# Patient Record
Sex: FEMALE | Race: WHITE | NOT HISPANIC OR LATINO | Employment: FULL TIME | URBAN - METROPOLITAN AREA
[De-identification: names, ages, dates, MRNs, and addresses within clinical notes are randomized per-mention and may not be internally consistent; named-entity substitution may affect disease eponyms.]

---

## 2023-08-11 ENCOUNTER — HOSPITAL ENCOUNTER (OUTPATIENT)
Dept: DATA CONVERSION | Facility: HOSPITAL | Age: 58
Discharge: HOME | End: 2023-08-11

## 2023-08-11 DIAGNOSIS — Z12.31 ENCOUNTER FOR SCREENING MAMMOGRAM FOR MALIGNANT NEOPLASM OF BREAST: ICD-10-CM

## 2023-12-15 ENCOUNTER — APPOINTMENT (OUTPATIENT)
Dept: PHYSICAL THERAPY | Facility: CLINIC | Age: 58
End: 2023-12-15
Payer: COMMERCIAL

## 2023-12-15 ENCOUNTER — EVALUATION (OUTPATIENT)
Dept: PHYSICAL THERAPY | Facility: CLINIC | Age: 58
End: 2023-12-15
Payer: COMMERCIAL

## 2023-12-15 DIAGNOSIS — M54.12 RADICULOPATHY, CERVICAL REGION: ICD-10-CM

## 2023-12-15 PROCEDURE — 97014 ELECTRIC STIMULATION THERAPY: CPT | Mod: GP | Performed by: PHYSICAL THERAPIST

## 2023-12-15 PROCEDURE — 97161 PT EVAL LOW COMPLEX 20 MIN: CPT | Mod: GP | Performed by: PHYSICAL THERAPIST

## 2023-12-15 PROCEDURE — 97140 MANUAL THERAPY 1/> REGIONS: CPT | Mod: GP | Performed by: PHYSICAL THERAPIST

## 2023-12-15 SDOH — ECONOMIC STABILITY: GENERAL: QUALITY OF LIFE: EXCELLENT

## 2023-12-15 ASSESSMENT — PAIN SCALES - GENERAL: PAINLEVEL_OUTOF10: 5 - MODERATE PAIN

## 2023-12-15 ASSESSMENT — ENCOUNTER SYMPTOMS
EXACERBATED BY: KEYBOARDING
QUALITY: BURNING
QUALITY: RADIATING
EXACERBATED BY: OVERHEAD ACTIVITY
EXACERBATED BY: MOVEMENT
QUALITY: NEEDLE-LIKE
PAIN SCALE: 0
PAIN SCALE AT HIGHEST: 6
QUALITY: DULL ACHE
EXACERBATED BY: LIFTING
ALLEVIATING FACTORS: MEDICATIONS
ALLEVIATING FACTORS: CHANGE IN POSITION
PAIN SCALE AT LOWEST: 2
QUALITY: THROBBING
ALLEVIATING FACTORS: RELAXATION

## 2023-12-15 ASSESSMENT — PAIN - FUNCTIONAL ASSESSMENT: PAIN_FUNCTIONAL_ASSESSMENT: 0-10

## 2023-12-15 NOTE — PROGRESS NOTES
Physical Therapy Evaluation and Treatment     Patient Name: Verona Villarreal  MRN: [id number]  PT Received On: 12/15/23  PT Evaluation Time Entry  PT Evaluation (Low) Time Entry: 20  PT Modalities Time Entry  E-Stim (Unattended) Time Entry: 10  PT Therapeutic Procedures Time Entry  Manual Therapy Time Entry: 12    Current Problem:   1. Radiculopathy, cervical region  Referral to Physical Therapy    Follow Up In Physical Therapy        Precautions:       Subjective Evaluation    History of Present Illness  Mechanism of injury: Pt is a 58 y/o female c/o neck pain with radiculopathy into RUE into her fingers (for several years). Over the last couple months it has gotten severely worse and her older HEP's are not helping. Radic pain and N/T/B, takes gabapentin but no relief. The use of computer mouse hurts RUE, turning her head hurts RUE, denies head aches. Cannot sleep 2/2 pain. This is her third round of PT. She has a home traction unit, but states laying on the floor is hurting her back.     Quality of life: excellent    Pain  Current pain ratin  At best pain ratin  At worst pain ratin  Quality: dull ache, needle-like, radiating, throbbing and burning  Relieving factors: relaxation, change in position and medications  Aggravating factors: keyboarding, lifting, movement and overhead activity  Progression: worsening    Hand dominance: right    Diagnostic Tests  X-ray: abnormal  MRI studies: abnormal    Treatments  Previous treatment: physical therapy and medication  Current treatment: medication and physical therapy  Patient Goals  Patient goals for therapy: increased strength, independence with ADLs/IADLs, return to sport/leisure activities, increased motion and decreased pain         2018 [facility] MRI:  Impression: Degenerative changes and disc disease, especially involving the  C4-C5, C5-C6 and C6-C7 level, with spinal canal and right foraminal  stenosis, similar to prior. No myelopathy.    Objective      Postural Observations  Seated posture: fair (head deviates left)  Standing posture: fair (head deviates left)    Additional Postural Observation Details  VERY GUARDED, RIGID POSTURE, PT DOESNT TO WANT TO ROTATE HER HEAD    Active Range of Motion   Cervical/Thoracic Spine   Cervical    Flexion: 40 (DEVIATES L with flexion) degrees with pain  Extension: 15 (L lateral flexion compensation) degrees with pain  Left lateral flexion: 34 degrees   Right lateral flexion: 5 degrees with pain  Left rotation: 50 degrees   Right rotation: 28 degrees with pain    Strength/Myotome Testing     Right Shoulder     Planes of Motion   Flexion: 4   Extension: 4   Abduction: 4-   Adduction: 4   External rotation at 0°: 4-   Internal rotation at 0°: 4     Right Elbow   Flexion: 4  Extension: 4-   ULTT +        Treatments:  Reviewed HEP she already has at home from previous PT       Dry needling following informed consent: with e-stim; 3Hz, mA to tolerance, applied to R sided c-spine ps, suboccipitals and R upper trap, for 10 minutes.     Manual: STM to cervical ps, OA release, gentle traction (increased RUE radic), R SB increased radic, L sided down glides caused RUE radic    Assessment & Plan     Assessment  Impairments: abnormal muscle firing, activity intolerance, impaired physical strength, lacks appropriate home exercise program and pain with function  Other impairment: numbness, tingling, burning, weakness  Assessment details: Pt presents with significant postural deviations 2/2 neck pain and RUE radiculopathy. Pt very limited with positions and activities that she can complete 2/2 RUE radic symptoms. 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 Pt requires skilled therapy to address her functional deficits, weakness, N/T/B,   Barriers to therapy: none  Prognosis: good    Plan  Therapy options: will be seen for skilled physical therapy services  Planned  modality interventions: electrical stimulation/Russian stimulation, TENS, microcurrent electrical stimulation, high voltage pulsed current (pain management), traction and ultrasound  Planned therapy interventions: functional ROM exercises, home exercise program, IADL retraining, strengthening, spinal/joint mobilization, postural training, neuromuscular re-education, motor coordination training, manual therapy, ADL retraining, body mechanics training, joint mobilization and stretching  Frequency: 2x week  Duration in visits: 16  Duration in weeks: 12  Treatment plan discussed with: patient       Low complexity due to patient's clinical presentation being stable and uncomplicated by any significant comorbidities that may affect rehab tolerance and progression.     Post-Treatment Pain:  Response to Interventions: same    Goals:   Active       PT Problem       neck       Start:  12/15/23    Expected End:  03/15/24       1) Pt will report pain levels no greater than 2/10 at worst with activity for less difficulty turning head, looking up, and driving.  2) Pt will display pain-free cervical spine AROM WFL for less difficulty turning head, looking up, and driving.  3) Pt will score <10% impairment on NDI to indicate significant improvement in neck function.  4) Pt will deny any radiating pain or N&T into BUE.  5) Pt will improve postural awareness and understand proper ergonomics enabling patient to make timely self-corrections and avoid postural strain that develops into myofascial restrictions.

## 2023-12-18 ENCOUNTER — DOCUMENTATION (OUTPATIENT)
Dept: PHYSICAL THERAPY | Facility: CLINIC | Age: 58
End: 2023-12-18
Payer: COMMERCIAL

## 2023-12-18 NOTE — PROGRESS NOTES
Physical Therapy                 Therapy Communication Note    Patient Name: Verona Villarreal  MRN: [id number]  Today's Date: 12/18/2023     Discipline: Physical Therapy    Missed Visit Reason:      Missed Time: No Show    Comment:

## 2024-01-03 ENCOUNTER — TREATMENT (OUTPATIENT)
Dept: PHYSICAL THERAPY | Facility: CLINIC | Age: 59
End: 2024-01-03
Payer: COMMERCIAL

## 2024-01-03 DIAGNOSIS — M54.12 RADICULOPATHY, CERVICAL REGION: ICD-10-CM

## 2024-01-03 PROCEDURE — 97110 THERAPEUTIC EXERCISES: CPT | Mod: GP

## 2024-01-03 PROCEDURE — 97140 MANUAL THERAPY 1/> REGIONS: CPT | Mod: GP

## 2024-01-03 PROCEDURE — 97014 ELECTRIC STIMULATION THERAPY: CPT | Mod: GP

## 2024-01-03 ASSESSMENT — PAIN SCALES - GENERAL: PAINLEVEL_OUTOF10: 3

## 2024-01-03 ASSESSMENT — PAIN - FUNCTIONAL ASSESSMENT: PAIN_FUNCTIONAL_ASSESSMENT: 0-10

## 2024-01-03 NOTE — PROGRESS NOTES
Physical Therapy Treatment    Patient Name: Verona Villarreal  MRN: [id number]  PT Received On: 01/03/24  Time Calculation  Start Time: 1535  Stop Time: 1615  Time Calculation (min): 40 min  PT Modalities Time Entry  E-Stim (Unattended) Time Entry: 10  PT Therapeutic Procedures Time Entry  Manual Therapy Time Entry: 10  Neuromuscular Re-Education Time Entry: 5  Therapeutic Exercise Time Entry: 10  Therapeutic Activity Time Entry: 5  Visit Number: 2  Visits/Dates Authorized: 16    Current Problem:   1. Radiculopathy, cervical region  Follow Up In Physical Therapy        Surgery:   Surgery date:     Precautions:        Subjective   General:    Pt states overall her symptoms have improved since her last visit. Pt has had some time off during the holidays and she hasn't had to use her computer at work as much. She missed her PT visit last time due to illness.     Pre-Treatment Symptoms:   Pain Assessment: 0-10  Pain Score: 3    Objective   Findings:     Treatments:      Therapeutic Exercise:   Cervical snags L/R rot x 10 L/R  Cervical CTJ ext x 10   Open book L/R x 10 each  Seated cervical distraction w/ scapular retraction 2x10        Neuromuscular Re-Ed:   Median nerve glides L/R 2x15 each  Radial nerve glides 2x15 each L/R      Therapeutic Activity:  Educated pt on activity modification/posture modification to minimize symptom flare up while working.     Gait Training:       Manual Therapy:   Manual distraction with towel and PROM cervical SB L/R x 30  Unilateral PA's to upper and midcervical spine grade III        Modalities:   Dry needling following informed consent: with e-stim; 3Hz, mA to tolerance, applied to R cervical paraspinals, R UT, R superficial radial nerve point and deep radial nerve point, for 10 minutes.      Assessment:      Pt reported symptom severity has improved with less work on the computer over the holidays. Pt provided cervical snags to improve neck ROM and nerve glides to reduce R UE  sx.    Post-Treatment Symptoms:   Response to Interventions: same    Plan:    Reduce R UE symptoms.     Goals:   Active       PT Problem       neck       Start:  12/15/23    Expected End:  03/15/24       1) Pt will report pain levels no greater than 2/10 at worst with activity for less difficulty turning head, looking up, and driving.  2) Pt will display pain-free cervical spine AROM WFL for less difficulty turning head, looking up, and driving.  3) Pt will score <10% impairment on NDI to indicate significant improvement in neck function.  4) Pt will deny any radiating pain or N&T into BUE.  5) Pt will improve postural awareness and understand proper ergonomics enabling patient to make timely self-corrections and avoid postural strain that develops into myofascial restrictions.

## 2024-01-05 ENCOUNTER — TREATMENT (OUTPATIENT)
Dept: PHYSICAL THERAPY | Facility: CLINIC | Age: 59
End: 2024-01-05
Payer: COMMERCIAL

## 2024-01-05 DIAGNOSIS — M54.12 RADICULOPATHY, CERVICAL REGION: ICD-10-CM

## 2024-01-05 PROCEDURE — 97140 MANUAL THERAPY 1/> REGIONS: CPT | Mod: GP

## 2024-01-05 PROCEDURE — 97014 ELECTRIC STIMULATION THERAPY: CPT | Mod: GP

## 2024-01-05 ASSESSMENT — PAIN SCALES - GENERAL: PAINLEVEL_OUTOF10: 3

## 2024-01-05 ASSESSMENT — PAIN - FUNCTIONAL ASSESSMENT: PAIN_FUNCTIONAL_ASSESSMENT: 0-10

## 2024-01-08 ENCOUNTER — TREATMENT (OUTPATIENT)
Dept: PHYSICAL THERAPY | Facility: CLINIC | Age: 59
End: 2024-01-08
Payer: COMMERCIAL

## 2024-01-08 DIAGNOSIS — M54.12 RADICULOPATHY, CERVICAL REGION: ICD-10-CM

## 2024-01-08 PROCEDURE — 97140 MANUAL THERAPY 1/> REGIONS: CPT | Mod: GP

## 2024-01-08 PROCEDURE — 97110 THERAPEUTIC EXERCISES: CPT | Mod: GP

## 2024-01-08 PROCEDURE — 97014 ELECTRIC STIMULATION THERAPY: CPT | Mod: GP

## 2024-01-08 ASSESSMENT — PAIN SCALES - GENERAL: PAINLEVEL_OUTOF10: 2

## 2024-01-08 ASSESSMENT — PAIN - FUNCTIONAL ASSESSMENT: PAIN_FUNCTIONAL_ASSESSMENT: 0-10

## 2024-01-10 ENCOUNTER — APPOINTMENT (OUTPATIENT)
Dept: PHYSICAL THERAPY | Facility: CLINIC | Age: 59
End: 2024-01-10
Payer: COMMERCIAL

## 2024-01-15 ENCOUNTER — TREATMENT (OUTPATIENT)
Dept: PHYSICAL THERAPY | Facility: CLINIC | Age: 59
End: 2024-01-15
Payer: COMMERCIAL

## 2024-01-15 DIAGNOSIS — M54.12 RADICULOPATHY, CERVICAL REGION: ICD-10-CM

## 2024-01-15 PROCEDURE — 97140 MANUAL THERAPY 1/> REGIONS: CPT | Mod: GP

## 2024-01-15 PROCEDURE — 97110 THERAPEUTIC EXERCISES: CPT | Mod: GP

## 2024-01-15 PROCEDURE — 97014 ELECTRIC STIMULATION THERAPY: CPT | Mod: GP

## 2024-01-15 ASSESSMENT — PAIN SCALES - GENERAL: PAINLEVEL_OUTOF10: 2

## 2024-01-15 ASSESSMENT — PAIN - FUNCTIONAL ASSESSMENT: PAIN_FUNCTIONAL_ASSESSMENT: 0-10

## 2024-01-15 NOTE — PROGRESS NOTES
Physical Therapy Treatment    Patient Name: Verona Villarreal  MRN: [id number]  PT Received On: 01/15/24  Time Calculation  Start Time: 1702  Stop Time: 1742  Time Calculation (min): 40 min  PT Modalities Time Entry  E-Stim (Unattended) Time Entry: 10  PT Therapeutic Procedures Time Entry  Manual Therapy Time Entry: 10  Therapeutic Exercise Time Entry: 17    Visit Number: 4  Visits/Dates Authorized: 16    Current Problem:   1. Radiculopathy, cervical region  Follow Up In Physical Therapy        Precautions:    N/A    Subjective   General:    Pt feels her sx are better. Less frequent and less intense. Doing nerve glides and being aware of aggravating positions at desk at work have helped.    Pre-Treatment Symptoms:   Pain Assessment: 0-10  Pain Score: 2    Objective   Findings:   Closing restrictions R mid-cerv region with radial nerve paresthesias with end-range rotation or sidebending, hypertonic cervical paraspinals.  Treatments:   Therapeutic Exercise:   Prone neck retraction 2x10  Sidelying neck sidebends x12 L  Prone shld ext 2# 2x12  Prone neck retraction/shld ext 2x12 2#  Neck retraction with rotation x10 L/R  Cervical SB w/ towel snag x 10 to R    DNP  Cervical snags L/R rot x 10 L/R  Cervical CTJ ext x 10  Open book L/R x 10 each  Seated cervical distraction w/ scapular retraction 2x10    Neuromuscular Re-Ed:     DNP  Median nerve glides L/R 2x15 each  Radial nerve glides 2x15 each L/R    Therapeutic Activity:      Manual Therapy:   Manual distraction with passive cervical SB to R, rotation to R x30  L lower cerv sideglide, and R upper cerv sideglides for R mid-cerv opening      Modalities:   Dry needling following informed consent: with e-stim; 3Hz, mA to tolerance, applied to R cervical paraspinals, R UT, for 15 minutes.  In massage chair    Assessment:    Pt having less irritable symptoms after manual therapy intervention. Pt responds best with passive cervical ROM and manual distraction. Pt to continue to  self manage symptoms and continue activity modification. Pt having less sx at work.     Post-Treatment Symptoms:   Response to Interventions: 1    Plan:    Reduce R UE symptoms.     Goals:

## 2024-01-17 ENCOUNTER — TREATMENT (OUTPATIENT)
Dept: PHYSICAL THERAPY | Facility: CLINIC | Age: 59
End: 2024-01-17
Payer: COMMERCIAL

## 2024-01-17 DIAGNOSIS — M54.12 RADICULOPATHY, CERVICAL REGION: ICD-10-CM

## 2024-01-17 PROCEDURE — 97140 MANUAL THERAPY 1/> REGIONS: CPT | Mod: GP

## 2024-01-17 PROCEDURE — 97014 ELECTRIC STIMULATION THERAPY: CPT | Mod: GP

## 2024-01-17 PROCEDURE — 97110 THERAPEUTIC EXERCISES: CPT | Mod: GP

## 2024-01-17 ASSESSMENT — PAIN SCALES - GENERAL: PAINLEVEL_OUTOF10: 2

## 2024-01-17 ASSESSMENT — PAIN - FUNCTIONAL ASSESSMENT: PAIN_FUNCTIONAL_ASSESSMENT: 0-10

## 2024-01-17 NOTE — PROGRESS NOTES
Physical Therapy Treatment    Patient Name: Verona Villarreal  MRN: [id number]  PT Received On: 01/17/24  Time Calculation  Start Time: 1702  Stop Time: 1740  Time Calculation (min): 38 min  PT Modalities Time Entry  E-Stim (Unattended) Time Entry: 10  PT Therapeutic Procedures Time Entry  Manual Therapy Time Entry: 15  Neuromuscular Re-Education Time Entry: 5  Therapeutic Exercise Time Entry: 10    Visit Number: 5  Visits/Dates Authorized: 16    Current Problem:   1. Radiculopathy, cervical region  Follow Up In Physical Therapy        Precautions:    N/A    Subjective   General:    Pt still feels her arm numbness is less.     Pre-Treatment Symptoms:   Pain Assessment: 0-10  Pain Score: 2    Objective   Findings:   Closing restrictions R mid-cerv region with radial nerve paresthesias with end-range rotation or sidebending, hypertonic cervical paraspinals.    Treatments:   Therapeutic Exercise:   Prone neck retraction 2x10  Sidelying neck sidebends x12 L  Prone shld ext 2# 2x12  Prone shld W 2# 2x12  Prone neck retraction/shld ext 2x12 2#  Neck retraction with rotation x10 L/R  Quadruped thoracic rotation L/R x 10    DNP  Cervical SB w/ towel snag x 10 to R  Cervical snags L/R rot x 10 L/R  Cervical CTJ ext x 10  Open book L/R x 10 each  Seated cervical distraction w/ scapular retraction 2x10    Neuromuscular Re-Ed:     Median nerve glides L/R 2x15 each  Radial nerve glides 2x15 each L/R    Therapeutic Activity:      Manual Therapy:   Manual distraction with passive cervical SB to R, rotation to R x30  L lower cerv sideglide, and R upper cerv sideglides for R mid-cerv opening      Modalities:   Dry needling following informed consent: with e-stim; 3Hz, mA to tolerance, applied to R cervical paraspinals, R UT, for 15 minutes.  In massage chair    Assessment:    Pt having less irritable symptoms after manual therapy intervention. Pt able to rotate and SB her head to the right without irritable RUE symptoms.      Post-Treatment Symptoms:   Response to Interventions: 1    Plan:    Reduce R UE symptoms.     Goals:   Active       PT Problem       neck       Start:  12/15/23    Expected End:  03/15/24       1) Pt will report pain levels no greater than 2/10 at worst with activity for less difficulty turning head, looking up, and driving.  2) Pt will display pain-free cervical spine AROM WFL for less difficulty turning head, looking up, and driving.  3) Pt will score <10% impairment on NDI to indicate significant improvement in neck function.  4) Pt will deny any radiating pain or N&T into BUE.  5) Pt will improve postural awareness and understand proper ergonomics enabling patient to make timely self-corrections and avoid postural strain that develops into myofascial restrictions.

## 2024-01-22 ENCOUNTER — DOCUMENTATION (OUTPATIENT)
Dept: PHYSICAL THERAPY | Facility: CLINIC | Age: 59
End: 2024-01-22
Payer: COMMERCIAL

## 2024-01-22 ENCOUNTER — APPOINTMENT (OUTPATIENT)
Dept: PHYSICAL THERAPY | Facility: CLINIC | Age: 59
End: 2024-01-22
Payer: COMMERCIAL

## 2024-01-22 NOTE — PROGRESS NOTES
Physical Therapy                 Therapy Communication Note    Patient Name: Verona Villarreal  MRN: [id number]  Today's Date: 1/22/2024     Discipline: Physical Therapy    Missed Visit Reason:      Missed Time: Cancel    Comment: via [employer]

## 2024-01-24 ENCOUNTER — TREATMENT (OUTPATIENT)
Dept: PHYSICAL THERAPY | Facility: CLINIC | Age: 59
End: 2024-01-24
Payer: COMMERCIAL

## 2024-01-24 DIAGNOSIS — M54.12 RADICULOPATHY, CERVICAL REGION: ICD-10-CM

## 2024-01-24 PROCEDURE — 97014 ELECTRIC STIMULATION THERAPY: CPT | Mod: GP | Performed by: PHYSICAL THERAPIST

## 2024-01-24 PROCEDURE — 97112 NEUROMUSCULAR REEDUCATION: CPT | Mod: GP | Performed by: PHYSICAL THERAPIST

## 2024-01-24 PROCEDURE — 97110 THERAPEUTIC EXERCISES: CPT | Mod: GP | Performed by: PHYSICAL THERAPIST

## 2024-01-24 ASSESSMENT — PAIN - FUNCTIONAL ASSESSMENT: PAIN_FUNCTIONAL_ASSESSMENT: 0-10

## 2024-01-24 ASSESSMENT — PAIN SCALES - GENERAL: PAINLEVEL_OUTOF10: 2

## 2024-01-29 ENCOUNTER — APPOINTMENT (OUTPATIENT)
Dept: PHYSICAL THERAPY | Facility: CLINIC | Age: 59
End: 2024-01-29
Payer: COMMERCIAL

## 2024-01-31 ENCOUNTER — APPOINTMENT (OUTPATIENT)
Dept: PHYSICAL THERAPY | Facility: CLINIC | Age: 59
End: 2024-01-31
Payer: COMMERCIAL

## 2024-02-07 ENCOUNTER — APPOINTMENT (OUTPATIENT)
Dept: PHYSICAL THERAPY | Facility: CLINIC | Age: 59
End: 2024-02-07
Payer: COMMERCIAL

## 2024-02-09 ENCOUNTER — APPOINTMENT (OUTPATIENT)
Dept: PHYSICAL THERAPY | Facility: CLINIC | Age: 59
End: 2024-02-09
Payer: COMMERCIAL

## 2024-02-21 ENCOUNTER — OFFICE VISIT (OUTPATIENT)
Dept: PRIMARY CARE | Facility: CLINIC | Age: 59
End: 2024-02-21
Payer: COMMERCIAL

## 2024-02-21 VITALS
HEART RATE: 70 BPM | OXYGEN SATURATION: 98 % | WEIGHT: 147 LBS | SYSTOLIC BLOOD PRESSURE: 118 MMHG | HEIGHT: 63 IN | DIASTOLIC BLOOD PRESSURE: 77 MMHG | BODY MASS INDEX: 26.05 KG/M2

## 2024-02-21 DIAGNOSIS — E55.9 VITAMIN D DEFICIENCY: ICD-10-CM

## 2024-02-21 DIAGNOSIS — I15.9 SECONDARY HYPERTENSION: ICD-10-CM

## 2024-02-21 DIAGNOSIS — Z00.00 ENCOUNTER FOR MEDICAL EXAMINATION TO ESTABLISH CARE: Primary | ICD-10-CM

## 2024-02-21 DIAGNOSIS — M48.02 CERVICAL SPINAL STENOSIS: ICD-10-CM

## 2024-02-21 DIAGNOSIS — M85.852 OSTEOPENIA OF NECK OF LEFT FEMUR: ICD-10-CM

## 2024-02-21 DIAGNOSIS — F41.9 ANXIETY: ICD-10-CM

## 2024-02-21 PROBLEM — M54.12 CERVICAL RADICULITIS: Status: ACTIVE | Noted: 2024-02-21

## 2024-02-21 PROBLEM — I10 HTN (HYPERTENSION): Status: ACTIVE | Noted: 2023-07-19

## 2024-02-21 PROBLEM — M41.9 SCOLIOSIS: Status: ACTIVE | Noted: 2023-07-19

## 2024-02-21 PROCEDURE — 1036F TOBACCO NON-USER: CPT | Performed by: NURSE PRACTITIONER

## 2024-02-21 PROCEDURE — 3078F DIAST BP <80 MM HG: CPT | Performed by: NURSE PRACTITIONER

## 2024-02-21 PROCEDURE — 3074F SYST BP LT 130 MM HG: CPT | Performed by: NURSE PRACTITIONER

## 2024-02-21 PROCEDURE — 99204 OFFICE O/P NEW MOD 45 MIN: CPT | Performed by: NURSE PRACTITIONER

## 2024-02-21 RX ORDER — GABAPENTIN 300 MG/1
300 CAPSULE ORAL 3 TIMES DAILY
COMMUNITY
End: 2024-06-05 | Stop reason: ALTCHOICE

## 2024-02-21 RX ORDER — BUPROPION HYDROCHLORIDE 150 MG/1
150 TABLET ORAL
COMMUNITY
End: 2024-06-05 | Stop reason: SDUPTHER

## 2024-02-21 NOTE — PROGRESS NOTES
"Subjective   Patient ID: Verona Villarreal is a 58 y.o. female who presents for New Patient Visit (Patient is here today to establish care with a new pcp. ).    Here for establishing care.   Her previous PCP retired from [employer].   Due for annual CPE in a few months.  Really wanted to come here to establish with a new system.  On vitamin D supplement for vitamin D deficiency and Magnesium Glycinate for pain and anxiety and disordered sleep.  Anxiety -controlled on bupropion   HTN-on metoprolol   Osteopenia of neck of left femur.  DEXA's were done in the office of her previous PCP.  She will have the results faxed over to this office.   Scoliosis   No nicotene, no vaping  Exercise- 3 times a week 30min walk  ETOH- occasional  Opthal- cloudy. Opthal has not found anything abn.  Has seen 3 or 4 ophthalmologist over the last couple of years without a diagnosis.  No other symptoms such as loss of vision floaters headaches or endocrine disorders  Wondering if issue with pituitary due to her blurred vision.  Her sister had a pituitary diagnosis but she is unsure of what it is           Review of Systems    Objective   /77   Pulse 70   Ht 1.6 m (5' 3\")   Wt 66.7 kg (147 lb)   SpO2 98%   BMI 26.04 kg/m²     Physical Exam  Constitutional:       Appearance: She is obese.   Cardiovascular:      Rate and Rhythm: Normal rate and regular rhythm.   Pulmonary:      Effort: Pulmonary effort is normal.      Breath sounds: Normal breath sounds.   Abdominal:      General: Bowel sounds are normal.      Palpations: Abdomen is soft.   Musculoskeletal:         General: Normal range of motion.   Neurological:      Mental Status: She is alert and oriented to person, place, and time.   Psychiatric:         Mood and Affect: Mood normal.         Behavior: Behavior normal.         Assessment/Plan   Diagnoses and all orders for this visit:  Encounter for medical examination to establish care  Comments:  Will schedule follow-up as appropriate " for her yearly CPE.  At that time we will update blood work and all screening.  Secondary hypertension  Vitamin D deficiency  Anxiety  Osteopenia of neck of left femur  Cervical spinal stenosis  Other orders  -     Follow Up In Primary Care - Health Maintenance; Future

## 2024-06-05 ENCOUNTER — OFFICE VISIT (OUTPATIENT)
Dept: PRIMARY CARE | Facility: CLINIC | Age: 59
End: 2024-06-05
Payer: COMMERCIAL

## 2024-06-05 VITALS
HEART RATE: 61 BPM | OXYGEN SATURATION: 97 % | BODY MASS INDEX: 24.63 KG/M2 | WEIGHT: 139 LBS | SYSTOLIC BLOOD PRESSURE: 127 MMHG | HEIGHT: 63 IN | DIASTOLIC BLOOD PRESSURE: 79 MMHG

## 2024-06-05 DIAGNOSIS — Z13.220 LIPID SCREENING: ICD-10-CM

## 2024-06-05 DIAGNOSIS — I15.9 SECONDARY HYPERTENSION: ICD-10-CM

## 2024-06-05 DIAGNOSIS — M85.852 OSTEOPENIA OF NECK OF LEFT FEMUR: ICD-10-CM

## 2024-06-05 DIAGNOSIS — Z00.00 WELLNESS EXAMINATION: Primary | ICD-10-CM

## 2024-06-05 DIAGNOSIS — F41.9 ANXIETY: ICD-10-CM

## 2024-06-05 DIAGNOSIS — Z12.31 BREAST CANCER SCREENING BY MAMMOGRAM: ICD-10-CM

## 2024-06-05 DIAGNOSIS — E55.9 VITAMIN D DEFICIENCY: ICD-10-CM

## 2024-06-05 PROCEDURE — 1036F TOBACCO NON-USER: CPT | Performed by: NURSE PRACTITIONER

## 2024-06-05 PROCEDURE — 3074F SYST BP LT 130 MM HG: CPT | Performed by: NURSE PRACTITIONER

## 2024-06-05 PROCEDURE — 3078F DIAST BP <80 MM HG: CPT | Performed by: NURSE PRACTITIONER

## 2024-06-05 PROCEDURE — 99396 PREV VISIT EST AGE 40-64: CPT | Performed by: NURSE PRACTITIONER

## 2024-06-05 RX ORDER — BUPROPION HYDROCHLORIDE 150 MG/1
150 TABLET ORAL
Qty: 90 TABLET | Refills: 3 | Status: SHIPPED | OUTPATIENT
Start: 2024-06-05

## 2024-06-05 RX ORDER — METOPROLOL SUCCINATE 50 MG/1
50 TABLET, EXTENDED RELEASE ORAL DAILY
Qty: 90 TABLET | Refills: 3 | Status: SHIPPED | OUTPATIENT
Start: 2024-06-05 | End: 2025-06-05

## 2024-06-05 NOTE — PROGRESS NOTES
"Subjective   Patient ID: Verona Villarreal is a 58 y.o. female who presents for Annual Exam (Patient is here today for her physical.Patient wanted to discuss blood work. ).    58 year old female here to get her annual CPE. Wants [facility]    Prevention:  Breast Ca screen: 8/2023  Colon Ca Screen: 5/2022  Lung Ca Screen: not indicated. Non smoker  DEXA:  Diabetes Screen: fasting BS q year  Opthal: Vision- LT eye blurred. Saw opthal- Dr Snyder. Trying new TX without benefit. Has a follow up soon.   Dental: Q 6 mo Arpit Garg  Derm- recent full body- 2 pre cancerous lesions removed on face  Exercise: occasional.   Work FT  Anxiety: controlled on wellbutrin  HTN: on metoprolol. BP looks good today  Started weight watchers- lost 15 lb.   Her mother had stomach cancer.  Verona make sure to have her EGDs and colonoscopies up-to-date.  We discussed risk factors for developing stomach cancer her risk is very low besides genetics         Review of Systems    Objective   /79   Pulse 61   Ht 1.6 m (5' 3\")   Wt 63 kg (139 lb)   SpO2 97%   BMI 24.62 kg/m²     Physical Exam  Vitals reviewed.   Constitutional:       General: She is not in acute distress.     Appearance: Normal appearance.   HENT:      Head: Normocephalic and atraumatic.   Eyes:      Extraocular Movements: Extraocular movements intact.      Conjunctiva/sclera: Conjunctivae normal.      Pupils: Pupils are equal, round, and reactive to light.   Neck:      Vascular: No carotid bruit.   Cardiovascular:      Rate and Rhythm: Normal rate and regular rhythm.      Pulses: Normal pulses.      Heart sounds: No murmur heard.  Pulmonary:      Effort: Pulmonary effort is normal.      Breath sounds: Normal breath sounds.   Abdominal:      General: Bowel sounds are normal. There is no distension.      Palpations: Abdomen is soft.      Tenderness: There is no abdominal tenderness.   Musculoskeletal:         General: Normal range of motion.      Cervical back: Normal " range of motion and neck supple.   Skin:     General: Skin is warm and dry.   Neurological:      General: No focal deficit present.      Mental Status: She is alert and oriented to person, place, and time. Mental status is at baseline.   Psychiatric:         Mood and Affect: Mood normal.         Behavior: Behavior normal.         Assessment/Plan   Diagnoses and all orders for this visit:  Wellness examination  Comments:  Mammogram ordered due after August.  Needs GYN  Orders:  -     CBC and Auto Differential; Future  -     Comprehensive Metabolic Panel; Future  -     TSH with reflex to Free T4 if abnormal; Future  -     Referral to Gynecology; Future  Secondary hypertension  Comments:  well controlled on Toprol. RF sent  Orders:  -     metoprolol succinate XL (Toprol-XL) 50 mg 24 hr tablet; Take 1 tablet (50 mg) by mouth once daily. Do not crush or chew.  Vitamin D deficiency  Comments:  Update vitamin D level.  She is on 2000 units.  She also has osteopenia.  Will adjust dose according to lab  Orders:  -     Vitamin D 25-Hydroxy,Total (for eval of Vitamin D levels); Future  -     XR DEXA bone density; Future  Anxiety  Comments:  Well-controlled on Wellbutrin XL.  Refill sent  Orders:  -     TSH with reflex to Free T4 if abnormal; Future  -     buPROPion XL (Wellbutrin XL) 150 mg 24 hr tablet; Take 1 tablet (150 mg) by mouth once daily in the morning. Take before meals.  Osteopenia of neck of left femur  Comments:  Due for bone density after August  Orders:  -     CBC and Auto Differential; Future  Breast cancer screening by mammogram  -     BI mammo bilateral screening tomosynthesis; Future  Lipid screening  -     Lipid Panel; Future  Other orders  -     Follow Up In Primary Care - Health Maintenance  -     Follow Up In Primary Care - Health Maintenance; Future  -     Follow Up In Primary Care - Established; Future    Follow-up every 6 months for routine due to chronic medication use and q. year for annual CPE

## 2024-08-07 ENCOUNTER — APPOINTMENT (OUTPATIENT)
Dept: RADIOLOGY | Facility: HOSPITAL | Age: 59
End: 2024-08-07
Payer: COMMERCIAL

## 2024-08-19 ENCOUNTER — DOCUMENTATION (OUTPATIENT)
Dept: PHYSICAL THERAPY | Facility: CLINIC | Age: 59
End: 2024-08-19
Payer: COMMERCIAL

## 2024-08-19 NOTE — PROGRESS NOTES
Physical Therapy    Discharge Summary    Name: Verona Villarreal  MRN: [id number]  : 1965  Date: 24    Discharge Summary: PT    Discharge Information: Date of discharge 24    Therapy Summary: General Comment: Pt wantsthis to be her last session, she is nervous abouther insurance and wants to make sure this is all covered and if it is she will come back. Will leave her chart open for 1 month and if we do not hear from her will discharge her from PT. Still having pretty much constant tingling down her R arm., Neck feels better, she is sleeping better and she is not having as much discomfort at work or after work.Pt still feels her arm numbness is less.      Rehab Discharge Reason: Patient requested due to being nervous about her insurance coverage.

## 2024-08-21 ENCOUNTER — HOSPITAL ENCOUNTER (OUTPATIENT)
Dept: RADIOLOGY | Facility: HOSPITAL | Age: 59
Discharge: HOME | End: 2024-08-21
Payer: COMMERCIAL

## 2024-08-21 VITALS — WEIGHT: 135 LBS | HEIGHT: 64 IN | BODY MASS INDEX: 23.05 KG/M2

## 2024-08-21 DIAGNOSIS — Z12.31 BREAST CANCER SCREENING BY MAMMOGRAM: ICD-10-CM

## 2024-08-21 PROCEDURE — 77063 BREAST TOMOSYNTHESIS BI: CPT | Performed by: RADIOLOGY

## 2024-08-21 PROCEDURE — 77067 SCR MAMMO BI INCL CAD: CPT | Performed by: RADIOLOGY

## 2024-08-21 PROCEDURE — 77067 SCR MAMMO BI INCL CAD: CPT

## 2024-08-26 ENCOUNTER — PATIENT MESSAGE (OUTPATIENT)
Dept: PRIMARY CARE | Facility: CLINIC | Age: 59
End: 2024-08-26
Payer: COMMERCIAL

## 2024-08-26 DIAGNOSIS — Z83.49 FAMILY HISTORY OF PITUITARY DISEASE: ICD-10-CM

## 2024-08-26 DIAGNOSIS — H53.8 BLURRED VISION, BILATERAL: Primary | ICD-10-CM

## 2024-09-04 ENCOUNTER — HOSPITAL ENCOUNTER (OUTPATIENT)
Dept: RADIOLOGY | Facility: HOSPITAL | Age: 59
Discharge: HOME | End: 2024-09-04
Payer: COMMERCIAL

## 2024-09-04 DIAGNOSIS — E55.9 VITAMIN D DEFICIENCY: ICD-10-CM

## 2024-09-04 PROCEDURE — 77080 DXA BONE DENSITY AXIAL: CPT | Performed by: RADIOLOGY

## 2024-09-04 PROCEDURE — 77080 DXA BONE DENSITY AXIAL: CPT

## 2024-09-04 ASSESSMENT — LIFESTYLE VARIABLES
3_OR_MORE_DRINKS_PER_DAY: N
CURRENT_SMOKER: N

## 2024-10-01 ENCOUNTER — TELEPHONE (OUTPATIENT)
Dept: PRIMARY CARE | Facility: CLINIC | Age: 59
End: 2024-10-01
Payer: COMMERCIAL

## 2024-10-07 ENCOUNTER — PATIENT MESSAGE (OUTPATIENT)
Dept: PRIMARY CARE | Facility: CLINIC | Age: 59
End: 2024-10-07
Payer: COMMERCIAL

## 2024-10-07 DIAGNOSIS — Z83.49 FAMILY HISTORY OF PITUITARY DISEASE: ICD-10-CM

## 2024-10-07 DIAGNOSIS — H53.8 BLURRED VISION, BILATERAL: Primary | ICD-10-CM

## 2024-10-16 ENCOUNTER — APPOINTMENT (OUTPATIENT)
Dept: RADIOLOGY | Facility: CLINIC | Age: 59
End: 2024-10-16
Payer: COMMERCIAL

## 2024-10-30 ENCOUNTER — HOSPITAL ENCOUNTER (OUTPATIENT)
Dept: RADIOLOGY | Facility: HOSPITAL | Age: 59
Discharge: HOME | End: 2024-10-30
Payer: COMMERCIAL

## 2024-10-30 DIAGNOSIS — H53.8 BLURRED VISION, BILATERAL: ICD-10-CM

## 2024-10-30 DIAGNOSIS — Z83.49 FAMILY HISTORY OF PITUITARY DISEASE: ICD-10-CM

## 2024-10-30 PROCEDURE — 70470 CT HEAD/BRAIN W/O & W/DYE: CPT | Performed by: RADIOLOGY

## 2024-10-30 PROCEDURE — 2550000001 HC RX 255 CONTRASTS: Performed by: NURSE PRACTITIONER

## 2024-10-30 PROCEDURE — 70470 CT HEAD/BRAIN W/O & W/DYE: CPT

## 2024-12-04 ENCOUNTER — APPOINTMENT (OUTPATIENT)
Dept: PRIMARY CARE | Facility: CLINIC | Age: 59
End: 2024-12-04
Payer: COMMERCIAL

## 2024-12-11 ENCOUNTER — APPOINTMENT (OUTPATIENT)
Dept: PRIMARY CARE | Facility: CLINIC | Age: 59
End: 2024-12-11
Payer: COMMERCIAL

## 2024-12-11 VITALS
HEART RATE: 73 BPM | OXYGEN SATURATION: 96 % | HEIGHT: 64 IN | SYSTOLIC BLOOD PRESSURE: 123 MMHG | DIASTOLIC BLOOD PRESSURE: 77 MMHG | BODY MASS INDEX: 23.73 KG/M2 | WEIGHT: 139 LBS

## 2024-12-11 DIAGNOSIS — F41.9 ANXIETY: Primary | ICD-10-CM

## 2024-12-11 DIAGNOSIS — I15.9 SECONDARY HYPERTENSION: ICD-10-CM

## 2024-12-11 PROCEDURE — 3008F BODY MASS INDEX DOCD: CPT | Performed by: NURSE PRACTITIONER

## 2024-12-11 PROCEDURE — 99213 OFFICE O/P EST LOW 20 MIN: CPT | Performed by: NURSE PRACTITIONER

## 2024-12-11 PROCEDURE — 3074F SYST BP LT 130 MM HG: CPT | Performed by: NURSE PRACTITIONER

## 2024-12-11 PROCEDURE — 3078F DIAST BP <80 MM HG: CPT | Performed by: NURSE PRACTITIONER

## 2024-12-11 NOTE — PROGRESS NOTES
"Subjective   Patient ID: Verona Villarreal is a 58 y.o. female who presents for Follow-up (Patient is here today for a 6 month follow up and do discuss her CT scan of her brain.).    58 year old female here for routine OV. No acute concerns  Had a CT scan of the brain due to blurred vision. Feels like a fog. Diff to drive at HS. Vision never clear. Saw an opthal specialist- no acute issues found, \"eye health is good\"  Labs UTD from her last OV  On wellbutrin which is helping her with anxiety  Metoprolol without SE  Did not get labs last OV she thought they were due for this OV. She will get them done after the first of the year         Review of Systems    Objective   /77   Pulse 73   Ht 1.626 m (5' 4\")   Wt 63 kg (139 lb)   SpO2 96%   BMI 23.86 kg/m²     Physical Exam  Constitutional:       Appearance: Normal appearance.   Cardiovascular:      Rate and Rhythm: Normal rate and regular rhythm.      Pulses: Normal pulses.      Heart sounds: Normal heart sounds.   Pulmonary:      Effort: Pulmonary effort is normal.      Breath sounds: Normal breath sounds.   Neurological:      General: No focal deficit present.      Mental Status: She is alert and oriented to person, place, and time. Mental status is at baseline.   Psychiatric:         Mood and Affect: Mood normal.         Behavior: Behavior normal.         Assessment/Plan   Diagnoses and all orders for this visit:  Anxiety  Comments:  Well-controlled on Wellbutrin  Secondary hypertension  Comments:  Well-controlled on beta-blocker  Other orders  -     Follow Up In Primary Care - Established    At this time she does not want to pursue any further testing in terms of her vision or pituitary.  She will let me know if she changes her mind     "

## 2025-05-22 ENCOUNTER — TELEPHONE (OUTPATIENT)
Dept: OBSTETRICS AND GYNECOLOGY | Facility: CLINIC | Age: 60
End: 2025-05-22
Payer: COMMERCIAL

## 2025-06-11 ENCOUNTER — APPOINTMENT (OUTPATIENT)
Dept: PRIMARY CARE | Facility: CLINIC | Age: 60
End: 2025-06-11
Payer: COMMERCIAL

## 2025-06-12 ENCOUNTER — APPOINTMENT (OUTPATIENT)
Dept: PRIMARY CARE | Facility: CLINIC | Age: 60
End: 2025-06-12
Payer: COMMERCIAL

## 2025-06-12 VITALS
DIASTOLIC BLOOD PRESSURE: 84 MMHG | SYSTOLIC BLOOD PRESSURE: 131 MMHG | WEIGHT: 142 LBS | OXYGEN SATURATION: 98 % | HEIGHT: 64 IN | BODY MASS INDEX: 24.24 KG/M2 | HEART RATE: 73 BPM

## 2025-06-12 DIAGNOSIS — E55.9 VITAMIN D DEFICIENCY: ICD-10-CM

## 2025-06-12 DIAGNOSIS — I15.9 SECONDARY HYPERTENSION: ICD-10-CM

## 2025-06-12 DIAGNOSIS — F41.9 ANXIETY: ICD-10-CM

## 2025-06-12 DIAGNOSIS — Z00.00 WELLNESS EXAMINATION: Primary | ICD-10-CM

## 2025-06-12 PROCEDURE — 3079F DIAST BP 80-89 MM HG: CPT | Performed by: NURSE PRACTITIONER

## 2025-06-12 PROCEDURE — 3008F BODY MASS INDEX DOCD: CPT | Performed by: NURSE PRACTITIONER

## 2025-06-12 PROCEDURE — 3075F SYST BP GE 130 - 139MM HG: CPT | Performed by: NURSE PRACTITIONER

## 2025-06-12 PROCEDURE — 1036F TOBACCO NON-USER: CPT | Performed by: NURSE PRACTITIONER

## 2025-06-12 PROCEDURE — 99396 PREV VISIT EST AGE 40-64: CPT | Performed by: NURSE PRACTITIONER

## 2025-06-12 RX ORDER — BUPROPION HYDROCHLORIDE 150 MG/1
150 TABLET ORAL
Qty: 90 TABLET | Refills: 3 | Status: SHIPPED | OUTPATIENT
Start: 2025-06-12

## 2025-06-12 RX ORDER — METOPROLOL SUCCINATE 50 MG/1
50 TABLET, EXTENDED RELEASE ORAL DAILY
Qty: 90 TABLET | Refills: 3 | Status: SHIPPED | OUTPATIENT
Start: 2025-06-12 | End: 2026-06-12

## 2025-06-12 ASSESSMENT — PROMIS GLOBAL HEALTH SCALE
RATE_QUALITY_OF_LIFE: VERY GOOD
RATE_AVERAGE_FATIGUE: MILD
RATE_AVERAGE_PAIN: 5
RATE_SOCIAL_SATISFACTION: VERY GOOD
RATE_PHYSICAL_HEALTH: VERY GOOD
CARRYOUT_PHYSICAL_ACTIVITIES: MOSTLY
RATE_GENERAL_HEALTH: VERY GOOD
RATE_MENTAL_HEALTH: GOOD
CARRYOUT_SOCIAL_ACTIVITIES: VERY GOOD
EMOTIONAL_PROBLEMS: SOMETIMES

## 2025-06-12 ASSESSMENT — ENCOUNTER SYMPTOMS
OCCASIONAL FEELINGS OF UNSTEADINESS: 0
LOSS OF SENSATION IN FEET: 0
DEPRESSION: 0

## 2025-06-12 ASSESSMENT — PATIENT HEALTH QUESTIONNAIRE - PHQ9
1. LITTLE INTEREST OR PLEASURE IN DOING THINGS: NOT AT ALL
2. FEELING DOWN, DEPRESSED OR HOPELESS: NOT AT ALL
SUM OF ALL RESPONSES TO PHQ9 QUESTIONS 1 AND 2: 0

## 2025-06-12 NOTE — PATIENT INSTRUCTIONS
HARLAN + L theanine from [employer]  [facility]  Increase the Vitamin D to 4,000 units a day with food

## 2025-06-12 NOTE — PROGRESS NOTES
"Subjective   Patient ID: Verona Villarreal is a 59 y.o. female who presents for Annual Exam (Patient is here today for a physical and would like to discuss sciatica pain down the right leg. ).    59 year old female here for annual CPE  Labs from 1/2025 reviewed. Chol and LDL elevated with good HDL and TG.   Vit D 37 on vit D 2,000 international units a day  Having chronic back pain RT le pain and numbness- has done PT in the past. Wore a brace as a child x 2 years.     Prevention:  Breast Ca screen: GYN tomorrow. Mamm 2024. No fam HX  Colon Ca Screen: colonoscopy 2022. Grandfather colon ca  Lung Ca Screen: non smoker  DEXA: 9/2024 nml  CT Cardiac Score: grandparents- CVA  Diabetes Screen: no fam HX  Dental: q 6 months  Diet: diet- 1 C coffee a day. 3 meals well balanced.   On vitamin D supplement for vitamin D deficiency and Magnesium Glycinate for pain and anxiety and disordered sleep.  Anxiety -controlled on bupropion  HTN-on metoprolol  Osteopenia of neck of left femur.  DEXA's were done in the office of her previous PCP.  She will have the results faxed over to this office.   Scoliosis   No nicotene, no vaping  Exercise- Tries 3 times a week 30min walk  ETOH- occasional  Opthal- cloudy. Opthal has not found anything abn.  Has seen 3 or 4 ophthalmologist over the last couple of years without a diagnosis.  No other symptoms such as loss of vision floaters headaches or endocrine disorders. Seeing opthal for follow up tomorrow. No change in her chronic vision  Wondering if issue with pituitary due to her blurred vision.  Her sister had a pituitary diagnosis but she is unsure of what it is              Review of Systems    Objective   /84   Pulse 73   Ht 1.626 m (5' 4\")   Wt 64.4 kg (142 lb)   SpO2 98%   BMI 24.37 kg/m²     Physical Exam  Vitals reviewed.   Constitutional:       General: She is not in acute distress.     Appearance: Normal appearance. She is normal weight.   HENT:      Head: Normocephalic and " atraumatic.   Eyes:      Extraocular Movements: Extraocular movements intact.      Conjunctiva/sclera: Conjunctivae normal.      Pupils: Pupils are equal, round, and reactive to light.   Neck:      Vascular: No carotid bruit.   Cardiovascular:      Rate and Rhythm: Normal rate and regular rhythm.      Pulses: Normal pulses.      Heart sounds: Normal heart sounds. No murmur heard.  Pulmonary:      Effort: Pulmonary effort is normal.      Breath sounds: Normal breath sounds.   Abdominal:      General: Bowel sounds are normal. There is no distension.      Palpations: Abdomen is soft.      Tenderness: There is no abdominal tenderness.   Musculoskeletal:         General: Normal range of motion.      Cervical back: Normal range of motion and neck supple.   Lymphadenopathy:      Cervical: No cervical adenopathy.   Skin:     General: Skin is warm and dry.   Neurological:      General: No focal deficit present.      Mental Status: She is alert and oriented to person, place, and time. Mental status is at baseline.   Psychiatric:         Mood and Affect: Mood normal.         Behavior: Behavior normal.         Thought Content: Thought content normal.         Assessment/Plan   Diagnoses and all orders for this visit:  Wellness examination  Secondary hypertension  Comments:  well controlled on Toprol. RF sent  Orders:  -     metoprolol succinate XL (Toprol-XL) 50 mg 24 hr tablet; Take 1 tablet (50 mg) by mouth once daily. Do not crush or chew.  Anxiety  Comments:  Well-controlled on Wellbutrin XL.  Refill sent. add HARLAN + L theanine  Orders:  -     buPROPion XL (Wellbutrin XL) 150 mg 24 hr tablet; Take 1 tablet (150 mg) by mouth once daily in the morning. Take before meals.  Vitamin D deficiency  Comments:  increase to 4,000 units a day wiht food  Other orders  -     Follow Up In Primary Care - Health Maintenance  -     Follow Up In Primary Care - Health Maintenance; Future

## 2025-06-13 ENCOUNTER — OFFICE VISIT (OUTPATIENT)
Dept: OBSTETRICS AND GYNECOLOGY | Facility: CLINIC | Age: 60
End: 2025-06-13
Payer: COMMERCIAL

## 2025-06-13 VITALS
HEIGHT: 63 IN | BODY MASS INDEX: 24.98 KG/M2 | SYSTOLIC BLOOD PRESSURE: 132 MMHG | DIASTOLIC BLOOD PRESSURE: 79 MMHG | WEIGHT: 141 LBS

## 2025-06-13 DIAGNOSIS — Z12.11 SCREEN FOR COLON CANCER: ICD-10-CM

## 2025-06-13 DIAGNOSIS — N95.2 VAGINAL ATROPHY: ICD-10-CM

## 2025-06-13 DIAGNOSIS — M81.0 POSTMENOPAUSAL BONE LOSS: ICD-10-CM

## 2025-06-13 DIAGNOSIS — Z01.419 ENCOUNTER FOR ANNUAL ROUTINE GYNECOLOGICAL EXAMINATION: Primary | ICD-10-CM

## 2025-06-13 DIAGNOSIS — Z12.31 ENCOUNTER FOR SCREENING MAMMOGRAM FOR MALIGNANT NEOPLASM OF BREAST: ICD-10-CM

## 2025-06-13 PROCEDURE — 3078F DIAST BP <80 MM HG: CPT

## 2025-06-13 PROCEDURE — 1036F TOBACCO NON-USER: CPT

## 2025-06-13 PROCEDURE — 99396 PREV VISIT EST AGE 40-64: CPT

## 2025-06-13 PROCEDURE — 3075F SYST BP GE 130 - 139MM HG: CPT

## 2025-06-13 PROCEDURE — 3008F BODY MASS INDEX DOCD: CPT

## 2025-06-13 ASSESSMENT — ENCOUNTER SYMPTOMS
DIZZINESS: 0
DEPRESSION: 0
DYSURIA: 0
SHORTNESS OF BREATH: 0
COUGH: 0
UNEXPECTED WEIGHT CHANGE: 0
FEVER: 0
OCCASIONAL FEELINGS OF UNSTEADINESS: 0
HEADACHES: 0
COLOR CHANGE: 0
NAUSEA: 0
FATIGUE: 0
ABDOMINAL PAIN: 0
LOSS OF SENSATION IN FEET: 0
VOMITING: 0
CHILLS: 0

## 2025-06-13 ASSESSMENT — PAIN SCALES - GENERAL: PAINLEVEL_OUTOF10: 0-NO PAIN

## 2025-06-13 NOTE — PROGRESS NOTES
"Subjective   Verona Villarreal is a 59 y.o. female who is here for a routine GYN exam. Last saw Dr. Mishra 2023.  -Menopausal; denies VB, pelvic pain, pressure, or bloating.  -Denies breast, vaginal, or urinary/bladder concerns today.  -Denies hematochezia or bowel changes.    Complaints:   none  HRT: no  History of abnormal Pap smear: no  History of abnormal mammogram: yes   - Hx of breast biopsy       OB History          3    Para   3    Term   3            AB        Living             SAB        IAB        Ectopic        Multiple        Live Births                      Review of Systems   Constitutional:  Negative for chills, fatigue, fever and unexpected weight change.   Respiratory:  Negative for cough and shortness of breath.    Gastrointestinal:  Negative for abdominal pain, nausea and vomiting.   Genitourinary:  Negative for dyspareunia, dysuria, pelvic pain and vaginal discharge.   Skin:  Negative for color change and rash.   Neurological:  Negative for dizziness and headaches.       Objective   /79   Ht 1.6 m (5' 3\")   Wt 64 kg (141 lb)   BMI 24.98 kg/m²        General:   Alert and oriented, in no acute distress   Neck: Supple. No visible thyromegaly.    Breast/Axilla: Normal to palpation bilaterally without masses, skin changes, or nipple discharge. Smooth round 1-2 cm lump at 2 oclock about 4-5 cm from nipple (US imaging on this 2023)   Abdomen: Soft, non-tender, without masses or organomegaly   Vulva: Normal architecture without erythema, masses, or lesions.    Vagina: Atrophic mucosa without lesions, masses. No abnormal vaginal discharge.    Cervix: Normal without masses, lesions, or signs of cervicitis   Uterus: Normal, mobile, non-enlarged uterus   Adnexa: Normal without masses or lesions   Pelvic Floor Normal    Psych Normal affect. Normal mood.      Assessment/Plan   Diagnoses and all orders for this visit:  Encounter for annual routine gynecological examination   - UTD on pap " smear, next due 7/2026.   - PCP recommendations given.  Encounter for screening mammogram for malignant neoplasm of breast  -     BI mammo bilateral screening tomosynthesis; Future  - Next due 8/2025.  Screen for colon cancer   - Per patient, UTD within the last 2 years.  Postmenopausal bone loss   - UTD on DEXA 9/4/24, normal BMD.  Vaginal atrophy   - Rvwd GSM, menopausal anatomy/tissue changes with loss of estrogen, and associated symptoms. Rvwd mgt options including moisturizers or vaginal estrogen replacement therapy. Recommended moisturizers OTC Replens or Hyalogyn. [place] handout on GSM also provided for patient education.    Yulia Camacho PA-C

## 2025-08-25 ENCOUNTER — HOSPITAL ENCOUNTER (OUTPATIENT)
Dept: RADIOLOGY | Facility: HOSPITAL | Age: 60
Discharge: HOME | End: 2025-08-25
Payer: COMMERCIAL

## 2025-08-25 VITALS — HEIGHT: 63 IN | BODY MASS INDEX: 23.92 KG/M2 | WEIGHT: 135 LBS

## 2025-08-25 DIAGNOSIS — Z12.31 ENCOUNTER FOR SCREENING MAMMOGRAM FOR MALIGNANT NEOPLASM OF BREAST: ICD-10-CM

## 2025-08-25 PROCEDURE — 77063 BREAST TOMOSYNTHESIS BI: CPT | Performed by: STUDENT IN AN ORGANIZED HEALTH CARE EDUCATION/TRAINING PROGRAM

## 2025-08-25 PROCEDURE — 77067 SCR MAMMO BI INCL CAD: CPT | Performed by: STUDENT IN AN ORGANIZED HEALTH CARE EDUCATION/TRAINING PROGRAM

## 2025-08-25 PROCEDURE — 77067 SCR MAMMO BI INCL CAD: CPT

## 2026-06-18 ENCOUNTER — APPOINTMENT (OUTPATIENT)
Dept: PRIMARY CARE | Facility: CLINIC | Age: 61
End: 2026-06-18
Payer: COMMERCIAL